# Patient Record
Sex: MALE | URBAN - METROPOLITAN AREA
[De-identification: names, ages, dates, MRNs, and addresses within clinical notes are randomized per-mention and may not be internally consistent; named-entity substitution may affect disease eponyms.]

---

## 2021-09-14 ENCOUNTER — TELEPHONE (OUTPATIENT)
Dept: OPHTHALMOLOGY | Age: 26
End: 2021-09-14

## 2023-03-01 ENCOUNTER — HOSPITAL ENCOUNTER (OUTPATIENT)
Age: 28
Discharge: HOME | End: 2023-03-01
Payer: OTHER MISCELLANEOUS

## 2023-03-01 ENCOUNTER — APPOINTMENT (OUTPATIENT)
Dept: GENERAL RADIOLOGY | Age: 28
End: 2023-03-01
Attending: NURSE PRACTITIONER
Payer: OTHER MISCELLANEOUS

## 2023-03-01 VITALS
HEART RATE: 72 BPM | RESPIRATION RATE: 16 BRPM | SYSTOLIC BLOOD PRESSURE: 137 MMHG | OXYGEN SATURATION: 98 % | DIASTOLIC BLOOD PRESSURE: 95 MMHG | TEMPERATURE: 98 F

## 2023-03-01 DIAGNOSIS — S39.92XA INJURY OF BACK, INITIAL ENCOUNTER: Primary | ICD-10-CM

## 2023-03-01 PROCEDURE — 99203 OFFICE O/P NEW LOW 30 MIN: CPT | Performed by: NURSE PRACTITIONER

## 2023-03-01 PROCEDURE — 72110 X-RAY EXAM L-2 SPINE 4/>VWS: CPT | Performed by: NURSE PRACTITIONER

## 2023-03-01 RX ORDER — PREDNISONE 20 MG/1
40 TABLET ORAL DAILY
Qty: 10 TABLET | Refills: 0 | Status: SHIPPED | OUTPATIENT
Start: 2023-03-01 | End: 2023-03-06

## 2023-03-01 RX ORDER — CYCLOBENZAPRINE HCL 10 MG
10 TABLET ORAL 3 TIMES DAILY PRN
Qty: 20 TABLET | Refills: 0 | Status: SHIPPED | OUTPATIENT
Start: 2023-03-01 | End: 2023-03-08

## 2023-03-01 NOTE — ED INITIAL ASSESSMENT (HPI)
Pt states 2/19 he lifted a box 40-50lbs from the floor and felt immediate low back pain. States since then has been using ice, muscle cream and ibuprofen with no relief.

## 2023-03-01 NOTE — DISCHARGE INSTRUCTIONS
Apply heat to the area 2-3 times a day for 20 minutes at a time. Take over-the-counter NSAIDs such as ibuprofen or naproxen for pain and swelling. May also take Tylenol for pain. Take Flexeril as prescribed for muscle relaxant and prednisone to help with inflammation. Take prednisone in the morning because it can keep you up at night. May apply Biofreeze or icy hot to the area as well. We should do stretching exercises throughout today.

## (undated) NOTE — LETTER
Date & Time: 3/1/2023, 2:16 PM  Patient: Kaiser Rao  Encounter Provider(s):    DILAN Beltre       To Whom It May Concern:    Kaiser Rao was seen and treated in our department on 3/1/2023. He may return to work once released by occupational health.     If you have any questions or concerns, please do not hesitate to call.        _____________________________  Physician/[facility] Signature